# Patient Record
Sex: MALE | Race: BLACK OR AFRICAN AMERICAN | NOT HISPANIC OR LATINO | URBAN - METROPOLITAN AREA
[De-identification: names, ages, dates, MRNs, and addresses within clinical notes are randomized per-mention and may not be internally consistent; named-entity substitution may affect disease eponyms.]

---
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
## 2021-05-20 ENCOUNTER — APPOINTMENT (OUTPATIENT)
Dept: URBAN - METROPOLITAN AREA CLINIC 252 | Age: 21
Setting detail: DERMATOLOGY
End: 2021-05-20

## 2021-05-20 VITALS — WEIGHT: 195 LBS | RESPIRATION RATE: 16 BRPM | HEIGHT: 66 IN

## 2021-05-20 DIAGNOSIS — L73.1 PSEUDOFOLLICULITIS BARBAE: ICD-10-CM

## 2021-05-20 PROCEDURE — OTHER PRESCRIPTION: OTHER

## 2021-05-20 PROCEDURE — OTHER COUNSELING: OTHER

## 2021-05-20 PROCEDURE — 99203 OFFICE O/P NEW LOW 30 MIN: CPT

## 2021-05-20 RX ORDER — MINOCYCLINE HYDROCHLORIDE 100 MG/1
100 MG CAPSULE ORAL BID
Qty: 180 | Refills: 0 | Status: ERX | COMMUNITY
Start: 2021-05-20

## 2021-05-20 RX ORDER — CLINDAMYCIN PHOSPHATE 10 MG/G
1% GEL TOPICAL QD-BID
Qty: 1 | Refills: 11 | Status: ERX | COMMUNITY
Start: 2021-05-20

## 2021-05-20 ASSESSMENT — LOCATION SIMPLE DESCRIPTION DERM
LOCATION SIMPLE: RIGHT ANTERIOR NECK
LOCATION SIMPLE: LEFT ANTERIOR NECK

## 2021-05-20 ASSESSMENT — LOCATION DETAILED DESCRIPTION DERM
LOCATION DETAILED: LEFT SUPERIOR ANTERIOR NECK
LOCATION DETAILED: RIGHT INFERIOR LATERAL NECK
LOCATION DETAILED: LEFT INFERIOR LATERAL NECK

## 2021-05-20 ASSESSMENT — LOCATION ZONE DERM: LOCATION ZONE: NECK

## 2021-05-20 NOTE — PROCEDURE: COUNSELING
Patient Specific Counseling (Will Not Stick From Patient To Patient): - Discussed will begin Minocycline 100 mg BID until resolved. If not improved after 2 months patient advised to return to clinic for further evaluation.
Detail Level: Detailed

## 2021-05-20 NOTE — HPI: INFECTION (FOLLICULITIS)
Is This A New Presentation, Or A Follow-Up?: Folliculitis
Additional History: Worse after shaving, hurts when pops these.